# Patient Record
Sex: MALE | Race: WHITE | Employment: FULL TIME | URBAN - METROPOLITAN AREA
[De-identification: names, ages, dates, MRNs, and addresses within clinical notes are randomized per-mention and may not be internally consistent; named-entity substitution may affect disease eponyms.]

---

## 2017-12-19 ENCOUNTER — HOSPITAL ENCOUNTER (INPATIENT)
Facility: HOSPITAL | Age: 57
LOS: 4 days | Discharge: HOME | DRG: 100 | End: 2017-12-23
Attending: EMERGENCY MEDICINE | Admitting: HOSPITALIST
Payer: COMMERCIAL

## 2017-12-19 ENCOUNTER — APPOINTMENT (OUTPATIENT)
Dept: CT IMAGING | Facility: HOSPITAL | Age: 57
DRG: 100 | End: 2017-12-19
Attending: EMERGENCY MEDICINE
Payer: COMMERCIAL

## 2017-12-19 ENCOUNTER — APPOINTMENT (OUTPATIENT)
Dept: GENERAL RADIOLOGY | Facility: HOSPITAL | Age: 57
DRG: 100 | End: 2017-12-19
Attending: HOSPITALIST
Payer: COMMERCIAL

## 2017-12-19 DIAGNOSIS — I62.9 INTRACRANIAL HEMORRHAGE (HCC): ICD-10-CM

## 2017-12-19 DIAGNOSIS — E87.2 METABOLIC ACIDOSIS: ICD-10-CM

## 2017-12-19 DIAGNOSIS — N28.9 RENAL INSUFFICIENCY: ICD-10-CM

## 2017-12-19 DIAGNOSIS — G40.909 SEIZURE DISORDER (HCC): Primary | ICD-10-CM

## 2017-12-19 PROCEDURE — 71010 XR CHEST AP PORTABLE  (CPT=71010): CPT | Performed by: HOSPITALIST

## 2017-12-19 PROCEDURE — 95819 EEG AWAKE AND ASLEEP: CPT | Performed by: OTHER

## 2017-12-19 PROCEDURE — 99221 1ST HOSP IP/OBS SF/LOW 40: CPT | Performed by: NEUROLOGICAL SURGERY

## 2017-12-19 PROCEDURE — 70450 CT HEAD/BRAIN W/O DYE: CPT | Performed by: EMERGENCY MEDICINE

## 2017-12-19 PROCEDURE — 99291 CRITICAL CARE FIRST HOUR: CPT | Performed by: OTHER

## 2017-12-19 PROCEDURE — 72125 CT NECK SPINE W/O DYE: CPT | Performed by: EMERGENCY MEDICINE

## 2017-12-19 RX ORDER — SODIUM PHOSPHATE, DIBASIC AND SODIUM PHOSPHATE, MONOBASIC 7; 19 G/133ML; G/133ML
1 ENEMA RECTAL ONCE AS NEEDED
Status: DISCONTINUED | OUTPATIENT
Start: 2017-12-19 | End: 2017-12-23

## 2017-12-19 RX ORDER — SODIUM CHLORIDE 9 MG/ML
INJECTION, SOLUTION INTRAVENOUS CONTINUOUS
Status: ACTIVE | OUTPATIENT
Start: 2017-12-19 | End: 2017-12-19

## 2017-12-19 RX ORDER — LEVOFLOXACIN 5 MG/ML
750 INJECTION, SOLUTION INTRAVENOUS
Status: DISCONTINUED | OUTPATIENT
Start: 2017-12-19 | End: 2017-12-20

## 2017-12-19 RX ORDER — LABETALOL HYDROCHLORIDE 5 MG/ML
10 INJECTION, SOLUTION INTRAVENOUS EVERY 10 MIN PRN
Status: DISCONTINUED | OUTPATIENT
Start: 2017-12-19 | End: 2017-12-23

## 2017-12-19 RX ORDER — ACETAMINOPHEN 325 MG/1
650 TABLET ORAL EVERY 4 HOURS PRN
Status: DISCONTINUED | OUTPATIENT
Start: 2017-12-19 | End: 2017-12-23

## 2017-12-19 RX ORDER — SENNOSIDES 8.6 MG
17.2 TABLET ORAL NIGHTLY
Status: DISCONTINUED | OUTPATIENT
Start: 2017-12-19 | End: 2017-12-23

## 2017-12-19 RX ORDER — LORAZEPAM 2 MG/ML
1 INJECTION INTRAMUSCULAR ONCE
Status: COMPLETED | OUTPATIENT
Start: 2017-12-19 | End: 2017-12-19

## 2017-12-19 RX ORDER — POLYETHYLENE GLYCOL 3350 17 G/17G
17 POWDER, FOR SOLUTION ORAL DAILY PRN
Status: DISCONTINUED | OUTPATIENT
Start: 2017-12-19 | End: 2017-12-23

## 2017-12-19 RX ORDER — LEVOFLOXACIN 5 MG/ML
500 INJECTION, SOLUTION INTRAVENOUS EVERY 24 HOURS
Status: DISCONTINUED | OUTPATIENT
Start: 2017-12-19 | End: 2017-12-19 | Stop reason: DRUGHIGH

## 2017-12-19 RX ORDER — LORAZEPAM 2 MG/ML
1 INJECTION INTRAMUSCULAR
Status: DISCONTINUED | OUTPATIENT
Start: 2017-12-19 | End: 2017-12-23

## 2017-12-19 RX ORDER — ONDANSETRON 2 MG/ML
4 INJECTION INTRAMUSCULAR; INTRAVENOUS EVERY 6 HOURS PRN
Status: DISCONTINUED | OUTPATIENT
Start: 2017-12-19 | End: 2017-12-19

## 2017-12-19 RX ORDER — METOCLOPRAMIDE HYDROCHLORIDE 5 MG/ML
5 INJECTION INTRAMUSCULAR; INTRAVENOUS EVERY 8 HOURS PRN
Status: DISCONTINUED | OUTPATIENT
Start: 2017-12-19 | End: 2017-12-23

## 2017-12-19 RX ORDER — METOCLOPRAMIDE HYDROCHLORIDE 5 MG/ML
10 INJECTION INTRAMUSCULAR; INTRAVENOUS EVERY 8 HOURS PRN
Status: DISCONTINUED | OUTPATIENT
Start: 2017-12-19 | End: 2017-12-19

## 2017-12-19 RX ORDER — BISACODYL 10 MG
10 SUPPOSITORY, RECTAL RECTAL
Status: DISCONTINUED | OUTPATIENT
Start: 2017-12-19 | End: 2017-12-23

## 2017-12-19 RX ORDER — DIVALPROEX SODIUM 500 MG/1
500 TABLET, DELAYED RELEASE ORAL EVERY 8 HOURS SCHEDULED
Status: DISCONTINUED | OUTPATIENT
Start: 2017-12-19 | End: 2017-12-21

## 2017-12-19 RX ORDER — ONDANSETRON 2 MG/ML
4 INJECTION INTRAMUSCULAR; INTRAVENOUS EVERY 6 HOURS PRN
Status: DISCONTINUED | OUTPATIENT
Start: 2017-12-19 | End: 2017-12-23

## 2017-12-19 RX ORDER — SODIUM CHLORIDE 9 MG/ML
INJECTION, SOLUTION INTRAVENOUS CONTINUOUS
Status: DISCONTINUED | OUTPATIENT
Start: 2017-12-19 | End: 2017-12-22

## 2017-12-19 RX ORDER — DOCUSATE SODIUM 100 MG/1
100 CAPSULE, LIQUID FILLED ORAL 2 TIMES DAILY
Status: DISCONTINUED | OUTPATIENT
Start: 2017-12-19 | End: 2017-12-23

## 2017-12-19 NOTE — CONSULTS
[id number] Liya Basurto Neurosurgery Consult    Blake Ramirez Patient Status:  Emergency    1960 MRN [id number]   Location 656 [facility] Attending Marleny Leos MD   Hosp Day # 0 PCP Oneal Rushing MD     REASON FOR CONSULTATION: 15   SpO2 100%   GENERAL:  Patient is a 61year old male in no acute distress. HEENT:  Normocephalic, nasal trauma with epistaxis  LUNGS: Clear to auscultation bilaterally. HEART:  S1, S2, Regular rate and rhythm.   NEUROLOGICAL:  This patient is alert a follow      REED Zafar  Peer5 [phone]  Pager [phone]  12/19/2017, 1:34 PM     Patient seen and examined with nurse practitioner  Case discussed  62-year-old male status post possible seizure  [facility]

## 2017-12-19 NOTE — CONSULTS
[facility]    Neuro critical care consultation    Saadjenny Dejesus Patient Status:  Inpatient    1960 MRN [id number]   [facility] 6NE-A Attending Man Verdugo,*   Hosp Day # 0 PCP Pradeep Yi MD       Reason for Consu Intravenous, Continuous  •  0.9%  NaCl infusion, , Intravenous, Continuous  •  acetaminophen (TYLENOL) tab 650 mg, 650 mg, Oral, Q4H PRN  •  Labetalol HCl (TRANDATE) injection 10 mg, 10 mg, Intravenous, Q10 Min PRN  •  niCARdipine HCl in NaCl (CARDENE) 20 Gait deferred.     Diagnostic Data:   Lab Results  Component Value Date    12/19/2017   K 4.6 12/19/2017    12/19/2017   CO2 8.0 12/19/2017   BUN 19 12/19/2017   CREATSERUM 2.01 12/19/2017    12/19/2017   CA 9.2 12/19/2017   ALKPHO 83 12/ complex and decision-making and or extensive discussion with the patient/family and clinical status. (Exclusive of billlable procedures)  Thank you for allowing us to participate in your patient's care.       Kodak Pro MD  [facility]

## 2017-12-19 NOTE — ED INITIAL ASSESSMENT (HPI)
EMS called for seizure, post-ictal upon their arrival.  Responding only to pain initially and now alert to verbal.

## 2017-12-19 NOTE — PROGRESS NOTES
12/19/17 1728   Clinical Encounter Type   Visited With Patient and family together   Routine Visit Introduction   Continue Visiting No   Patient Spiritual Encounters   Spiritual Needs Patient and family present spiritual strength   Spiritual Interventio

## 2017-12-19 NOTE — H&P
[facility] Hospitalist History and Physical      CC: AMS, [address]    PCP: Boom Castro MD    History of Present Illness: Patient is a 61year old male with PMH sig for hx of R cavernous angioma with bleed and seizure disorder here with AMS.  Was found down earlier tod distress  Eyes: Pink conjunctivae, No ptosis  Pulm: Lungs clear bilaterally, normal respiratory effort  CV: Heart with regular rate and rhythm, no murmur  GI: Abdomen soft, nontender, nondistended  Ext: No cyanosis, clubbing, or edema  Skin: Skin warm and suspect 2/2 poor PO but unable to obtain good history    Leukocytosis- may be reactive, eval for infectious etiology given AMS  - Check UA/Ucx and CXR  - Check procal  - Afebrile    Hyperglycemia  - No known diagnosis of DM  - HA1C  - SSI for now    FEN:

## 2017-12-19 NOTE — ED PROVIDER NOTES
Patient Seen in: [facility] Emergency Department    History   Patient presents with:  Seizure Disorder (neurologic)    Stated Complaint: seizure, post-ictal    HPI    62-year-old male presents the emergency department for altered mental status.   His HPI.  Constitutional and vital signs reviewed. All other systems reviewed and negative except as noted above.     Physical Exam   ED Triage Vitals  BP: 113/60 [12/19/17 1031]  Pulse: 116 [12/19/17 1031]  Resp: 24 [12/19/17 1031]  Temp: 98.4 °F (36.9 °C Neutrophil Absolute 7.25 (*)     Lymphocyte Absolute 5.28 (*)     Monocyte Absolute 1.16 (*)     Basophil Absolute 0.14 (*)     All other components within normal limits   PROLACTIN - Normal   CBC WITH DIFFERENTIAL WITH PLATELET    Narrative:      The ron degenerative changes.     There is straightening of the normal cervical lordosis with anatomic alignment.  Vertebral body heights are well-maintained.  Moderate degenerative disc space loss and endplate spurring is seen most pronounced at C4-5 thru C6-7.   admitted to the [facility] hospitalist Dr. Rickey Saravia. Neurosurgery consult Dr. Princess Strickland  [facility] neurology Dr. Shantell Kate notified. Agree with initiation of Keppra. Will follow when patient is out of Neuro ICU. Consulted DR. Nathen Mustafa.   Agrees to f

## 2017-12-19 NOTE — PROCEDURES
ELECTROENCEPHALOGRAM REPORT      Patient Name: Kristine Dela Cruz  Chart ID: [id number]  Ordering Physician: Dr Abhi Watters              Date of Test: 12/20/2017  Patient Diagnosis: Generalized seizure    HISTORY    PT IS A 62 YO MALE WHO PRESENTS AFTER A SZ AND A electrographic seizures seen. Clinical correlation is recommended. Thank you for allowing us to participate in your patient's care.       Kathia Rojas MD  [facility]

## 2017-12-19 NOTE — PAYOR COMM NOTE
--------------  ADMISSION REVIEW     Payor: [facility]  Subscriber #:  [id number]  Authorization Number: [id number]/A    Admit date: 12/19/17  Admit time: 1341       Admitting Physician: Len Estrada MD  Attending Physician:  Len Estrada Review of patient's history is that he had a prior history of acute intracranial hemorrhage suspected to be secondary to cavernous angioma. He stabilized without any neurosurgical intervention.   He initially was treated with Dilantin and was seen by Joie Abdomen: Soft, nontender, nondistended. There is bowel sounds throughout 4 quadrants. There is no guarding or rebound tenderness. Extremities: There is no clubbing, cyanosis, edema. Neurologic exam: Cranial nerves 2-12 are intact.   There is no focal mo Comment: FINDINGS:    No acute fracture or subluxation in the cervical spine.  C1-2 articulation intact with mild degenerative changes.   There is straightening of the normal cervical lordosis with anatomic alignment.  Vertebral body heights are well-maint This is a 68-year-old male with a previous history of a cavernous angioma and intracranial hemorrhage who presents for an acute generalized seizure. CT scan of the brain shows questionable hemorrhage to the right lateral frontal lobe.   Patient's neurologi From NS standpoint, do not need to repeat CT brain unless he has neuro changes. Recommend holding off on MRI for 2 weeks to give the blood time to reabsorb to get a better look at the CM.     Dr. Bula Gottron to [address]  [address]

## 2017-12-20 ENCOUNTER — APPOINTMENT (OUTPATIENT)
Dept: CV DIAGNOSTICS | Facility: HOSPITAL | Age: 57
DRG: 100 | End: 2017-12-20
Attending: HOSPITALIST
Payer: COMMERCIAL

## 2017-12-20 ENCOUNTER — APPOINTMENT (OUTPATIENT)
Dept: ULTRASOUND IMAGING | Facility: HOSPITAL | Age: 57
DRG: 100 | End: 2017-12-20
Attending: INTERNAL MEDICINE
Payer: COMMERCIAL

## 2017-12-20 PROCEDURE — 93306 TTE W/DOPPLER COMPLETE: CPT | Performed by: HOSPITALIST

## 2017-12-20 PROCEDURE — 99291 CRITICAL CARE FIRST HOUR: CPT | Performed by: OTHER

## 2017-12-20 PROCEDURE — 99231 SBSQ HOSP IP/OBS SF/LOW 25: CPT | Performed by: NEUROLOGICAL SURGERY

## 2017-12-20 PROCEDURE — 99255 IP/OBS CONSLTJ NEW/EST HI 80: CPT | Performed by: INTERNAL MEDICINE

## 2017-12-20 PROCEDURE — 76775 US EXAM ABDO BACK WALL LIM: CPT | Performed by: INTERNAL MEDICINE

## 2017-12-20 NOTE — OCCUPATIONAL THERAPY NOTE
OCCUPATIONAL THERAPY QUICK EVALUATION - INPATIENT    Room Number: 9884/8413-R  Evaluation Date: 12/20/2017     Type of Evaluation: Quick Eval  Presenting Problem: seizure, R frontal intracranial hemorrhage    Physician Order: IP Consult to Occupational The independent with ADL, IADL. Lives with wife.      OBJECTIVE  Precautions: Seizure  Fall Risk: Standard fall risk    WEIGHT BEARING RESTRICTION  Weight Bearing Restriction: None                PAIN ASSESSMENT  Ratin          COGNITION  intact    RANGE OF cavernous malformation. CT cervical spine negative. Complete medical history and occupational profile noted above. Functional outcome measures completed include AM-PAC, ROM, and PHQ 9. Pt is performing ADL safely and independently.         Therapist led p

## 2017-12-20 NOTE — CM/SW NOTE
12/20/17 1100   CM/SW Screening   Referral [facility] staff; Chart review     Pt discussed with therapy and nursing, pt has no dc needs.   Lani Harris, 12/20/17, 11:47 AM

## 2017-12-20 NOTE — PROGRESS NOTES
Assumed care for patient at 0730. Patient alert and oriented x4. No complaints of pain. Neuros intact. No seizures noted. VSS. Patient ambulating in room but refusing to get to the chair because he was tired. Plan of care discussed with patient and wife.  A

## 2017-12-20 NOTE — PROGRESS NOTES
[facility] Hospitalist Progress Note                                                                   [address] [facility]  12/28/1960    CC: FU fall     Interval History:  - Feels better today though r above.    Assessment/Plan:  AMS/Fall  - Suspect 2/2 seizure though also eval for infection, cardiac etiology  - With small ICH  - Infectious workup as below so far unrevealing  - EKG sinus tach, no acute ischemic change, trop negative  - Monitor on tele- n

## 2017-12-20 NOTE — PROGRESS NOTES
Patient transferred from [address] [place] [address].. Patient alert and oriented x4. No complaints of pain. Wife at bedside. Neuro checks q4 hours, stable. VS stable. Will continue to monitor patient.

## 2017-12-20 NOTE — CONSULTS
[facility]  Report of Consultation    Екатерина Stoddard Patient Status:  Inpatient    1960 MRN [id number]   [place] [facility] 6NE-A Attending Damian Florez,*   Hosp Day # 1 PCP Gwyn Keating MD     Reason for Consultation:  RUBEN rectal suppository 10 mg, 10 mg, Rectal, Daily PRN  •  FLEET ENEMA (FLEET) 7-19 GM/118ML enema 133 mL, 1 enema, Rectal, Once PRN  •  LORazepam (ATIVAN) injection 1 mg, 1 mg, Intravenous, Q15 Min PRN  •  divalproex Sodium (DEPAKOTE) DR tab 500 mg, 500 mg, O 12/20/2017   HCT 38.0 12/20/2017   .0 12/20/2017   CREATSERUM 3.72 12/20/2017   BUN 29 12/20/2017    12/20/2017   K 4.7 12/20/2017    12/20/2017   CO2 21.0 12/20/2017    12/20/2017   CA 8.0 12/20/2017   ALB 4.0 12/19/2017   ALKPHO Bladder not full but will check formal renal u/s. U/A otherwise not suggestive of GN. Will check urine lytes and pro/creat. incr IVF and will follow. Rate in rise in creat certainly concerning and will follow closely for need for HD.       #2.  AMS/fall

## 2017-12-20 NOTE — PHYSICAL THERAPY NOTE
PHYSICAL THERAPY QUICK EVALUATION - INPATIENT    Room Number: 7887/5687-M  Evaluation Date: 12/20/2017  Presenting Problem: R frontal SAH  Physician Order: PT Eval and Treat     Pt is 61year old male admitted on 12/19/2017 from home,he ws found by his s wheelchair, bedside commode, etc.): None   -   Moving from lying on back to sitting on the side of the bed?: None   How much help from another person does the patient currently need. ..   -   Moving to and from a bed to a chair (including a wheelchair)?: No

## 2017-12-20 NOTE — PROGRESS NOTES
[id number] Liya Basurto Neurosurgery Progress Note    Alok Tellez Patient Status:  Inpatient    1960 MRN [id number]   [facility] 6NE-A Attending Marjan Romero Eduardo Day # 1 PCP Zeke Andrews MD     Subjective:  Will  consulted    Dr. Lyla Dasilva to follow      REED Franklin  [address]  Pager [phone]  12/20/2017, 9:22 AM      Pt seen and examined with np  case discussed  Doing well today  Medical issues paramount  Ok to d/c from [facility]

## 2017-12-20 NOTE — CONSULTS
[facility] Pharmacy Note:  Renal Dose Adjustment for Metoclopramide (REGLAN)    Екатерина Stoddard has been prescribed Metoclopramide (REGLAN) 10 mg every 8 hours as needed for N/V. Estimated Creatinine Clearance: 37.9 mL/min (based on SCr of 2.01 mg/dL (H)).     H

## 2017-12-20 NOTE — PLAN OF CARE
Impaired Activities of Daily Living    • Achieve highest/safest level of independence in self care Progressing        NEUROLOGICAL - ADULT    • Absence of seizures Progressing    • Achieves maximal functionality and self care Progressing        Pt remains

## 2017-12-20 NOTE — PROGRESS NOTES
[facility]    Progress Note    Milagro Carlinandie Patient Status:  Inpatient    1960 MRN [id number]   [facility] 6NE-A Attending Alessandra Romero Day # 1 PCP Francesca Fields MD     Subjective:  Milagroawa Askew is a(n) 5 negative. Repeat CT head with any change in his status otherwise Neurosurgery does not want any repeat CT scan.  MRI brain with contrast in 2 weeks as an outpatient        Cardiac:  Monitor vitals and goal SBP < 160        -Pulmonary:  - saturating well o

## 2017-12-20 NOTE — CONSULTS
[facility] Pharmacy Note:  Renal Adjustment for Levaquin (levofloxacin)    Nani Gomez is a 61year old male who has been prescribed Levaquin (levofloxacin) 500 mg every 24 hrs.   CrCl is estimated creatinine clearance is 37.9 mL/min (based on SCr of 2.01 mg/dL

## 2017-12-21 PROCEDURE — 99233 SBSQ HOSP IP/OBS HIGH 50: CPT | Performed by: INTERNAL MEDICINE

## 2017-12-21 PROCEDURE — 99233 SBSQ HOSP IP/OBS HIGH 50: CPT | Performed by: OTHER

## 2017-12-21 RX ORDER — MAGNESIUM CARB/ALUMINUM HYDROX 105-160MG
148 TABLET,CHEWABLE ORAL ONCE AS NEEDED
Status: ACTIVE | OUTPATIENT
Start: 2017-12-21 | End: 2017-12-21

## 2017-12-21 RX ORDER — DIVALPROEX SODIUM 500 MG/1
500 TABLET, DELAYED RELEASE ORAL EVERY 12 HOURS SCHEDULED
Status: DISCONTINUED | OUTPATIENT
Start: 2017-12-21 | End: 2017-12-22

## 2017-12-21 NOTE — PROGRESS NOTES
[id number] Liya Basurto Neurology Progress Note    Christal Jane Patient Status:  Inpatient    1960 MRN [id number]   [facility] 7NE-A Attending Heather, [employer] Day # 2 PCP Norvel Lefort, MD         Subjective:  Rosa De (Neutrophils 10)  CMP with renal dysfunction  Depakote level pending      Assessment:   Breakthrough Seizure, EEG as above, mildly abnl, slowed  Hx Seizures since 2011, not on AEDs at home, intolerance to Keppra (mood/anger)  R frontal cavernous angioma, k

## 2017-12-21 NOTE — PROGRESS NOTES
[facility] Hospitalist Progress Note                                                                   [address] [facility]  12/28/1960    CC: FU fall     Interval History:  - Sleeping    Current Meds: management per neurology     Seizure disorder   - AED per neuro, dose decreased today due to [facility]  - Per NS, no plan for operative intervention  - Not on any AC  - BP OK     RUBEN  - Felt to be 2/2 ATN per renal  - Worse today  - IVF per renal  - Cont

## 2017-12-21 NOTE — PLAN OF CARE
Assumed care at [address]. Alert and oriented x4. Telemetry monitor reading SR. RA. Rt AC with IVF infusing assessed and maintained. Assessment unchanged from the beginning of shift. Call light in reach at the bedside. Will continue to monitor.      Impaired Act

## 2017-12-21 NOTE — PROGRESS NOTES
[facility]  Nephrology Progress Note    Dicky Knee Patient Status:  Inpatient    1960 MRN [id number]   [facility] 7NE-A Attending Raimundo Stringer,*   Hosp Day # 2 PCP Darcie Thomas MD       SUBJECTIVE:  Appetite poor, 4.0  2.9*       Recent Labs   Lab  12/19/17   1702  12/19/17   2150  12/20/17   0756  12/20/17   1145  12/20/17   2037   PGLU  87  98  114*  93  92     Urine creat 36.5  Urine na 37    Renal u/s no hydro    Meds:     Current Facility-Administered Medicatio

## 2017-12-21 NOTE — PLAN OF CARE
Assumed care @ 0700  Drowsy, Ox4, VSS on RA, NSR on tele  No neuro deficits noted  Pt states he feels \"woozy\" and \"out of it\" which he is attributing to depakote. Geo MASTERS updated. depakote decreased to bid  Pt with poor appetite and nausea.  Ernie Canchola

## 2017-12-21 NOTE — PAYOR COMM NOTE
--------------  CONTINUED STAY REVIEW    Payor: [employer]  Subscriber #:  [id number]  Authorization Number: [id number]    Admit date: 12/19/17  Admit time: 56    Admitting Physician: David Mathew MD  Attending Physician:  Dread Thomason back off IVF but cont at slower rate given ongoing poor intake. Will follow creat closely but no indication as yet for HD. Hopefully creat will plateau and improve next few days.   #2. Seizure- remains stable.   Per neuro  Questions/concerns were discuss

## 2017-12-22 PROCEDURE — 99233 SBSQ HOSP IP/OBS HIGH 50: CPT | Performed by: INTERNAL MEDICINE

## 2017-12-22 PROCEDURE — 99233 SBSQ HOSP IP/OBS HIGH 50: CPT | Performed by: OTHER

## 2017-12-22 NOTE — PROGRESS NOTES
[facility]  Nephrology Progress Note    Екатерина Stoddard Patient Status:  Inpatient    1960 MRN [id number]   [facility] 7NE-A Attending Daja Romero Day # 3 PCP Gwyn Keating MD       SUBJECTIVE:  Appetite improv 12/19/17   2150  12/20/17   0756  12/20/17   1145  12/20/17 2037   PGLU  87  98  114*  93  92     Urine creat 36.5  Urine na 37    Renal u/s no hydro    Meds:     Current Facility-Administered Medications:  Valproate Sodium (DEPACON) 500 mg in sodium chl

## 2017-12-22 NOTE — PAYOR COMM NOTE
--------------  CONTINUED STAY REVIEW    Payor: [facility]  Subscriber #:  [id number]  Authorization Number: [id number]    Admit date: 12/19/17  Admit time: 1341    Admitting Physician: Leia Cohen MD  Attending Physician:  Domingo Boyce MD  P Valproate Sodium (DEPACON) 500 mg in sodium chloride 0.9 % 100 mL IVPB     Date Action Dose Route User    12/22/2017 1049 New Bag 500 mg Intravenous Christiano Perales RN          PER NEPHROLOGY 12-21:      #1.  RUBEN- clinical picture most c/w ATN as no evidence

## 2017-12-22 NOTE — PROGRESS NOTES
[id number] Liya Basurto Neurology Progress Note    Ashli Ask Patient Status:  Inpatient    1960 MRN [id number]   [facility] 7NE-A Attending Stephanie Romero Day # 3 PCP Sammi Hodgkins, MD         Subjective:  Afsaneh Proper level 90 on 12/21, will re-check tomorrow 12/23       Assessment:   Breakthrough Seizure, EEG as above, mildly abnl, slowed  Hx Seizures since 2011, not on AEDs at home, intolerance to Keppra (mood/anger)  R frontal cavernous angioma, known since 2011  RUBEN touch throughout   Coord: FNF intact with no tremor or dysmetria  Romberg: deferred  Gait: deferred    Imaging: no new imaging; prior imaging as noted above       Assessment/Plan:   Breakthrough Seizure: history of seizures 2011, not on AED medications at

## 2017-12-22 NOTE — PLAN OF CARE
Assumed care at [address]. Alert and oriented x4. Telemetry monitor reading SR. Rt AC with IVF infusing assessed and maintained. Suppository given before change of shift and patient stated he had multiple stools throughout night.  Nausea improved, but not comple

## 2017-12-23 ENCOUNTER — TELEPHONE (OUTPATIENT)
Dept: NEPHROLOGY | Facility: CLINIC | Age: 57
End: 2017-12-23

## 2017-12-23 VITALS
WEIGHT: 208.56 LBS | TEMPERATURE: 98 F | SYSTOLIC BLOOD PRESSURE: 135 MMHG | HEIGHT: 67 IN | BODY MASS INDEX: 32.73 KG/M2 | DIASTOLIC BLOOD PRESSURE: 72 MMHG | HEART RATE: 58 BPM | OXYGEN SATURATION: 96 % | RESPIRATION RATE: 18 BRPM

## 2017-12-23 DIAGNOSIS — N17.9 AKI (ACUTE KIDNEY INJURY) (HCC): Primary | ICD-10-CM

## 2017-12-23 PROCEDURE — 99233 SBSQ HOSP IP/OBS HIGH 50: CPT | Performed by: OTHER

## 2017-12-23 PROCEDURE — 99232 SBSQ HOSP IP/OBS MODERATE 35: CPT | Performed by: INTERNAL MEDICINE

## 2017-12-23 RX ORDER — DIVALPROEX SODIUM 250 MG/1
250 TABLET, DELAYED RELEASE ORAL EVERY 12 HOURS SCHEDULED
Status: DISCONTINUED | OUTPATIENT
Start: 2017-12-23 | End: 2017-12-23

## 2017-12-23 RX ORDER — DIVALPROEX SODIUM 500 MG/1
500 TABLET, DELAYED RELEASE ORAL EVERY 12 HOURS SCHEDULED
Status: DISCONTINUED | OUTPATIENT
Start: 2017-12-23 | End: 2017-12-23

## 2017-12-23 RX ORDER — DIVALPROEX SODIUM 500 MG/1
500 TABLET, DELAYED RELEASE ORAL EVERY 12 HOURS SCHEDULED
Qty: 60 TABLET | Refills: 1 | Status: SHIPPED | OUTPATIENT
Start: 2017-12-23 | End: 2017-12-27

## 2017-12-23 RX ORDER — ONDANSETRON 4 MG/1
4 TABLET, FILM COATED ORAL EVERY 8 HOURS PRN
Qty: 10 TABLET | Refills: 0 | Status: SHIPPED | OUTPATIENT
Start: 2017-12-23 | End: 2017-12-27 | Stop reason: ALTCHOICE

## 2017-12-23 NOTE — PROGRESS NOTES
[id number] Liya Basurto Neurology Progress Note    Dread Anaya Patient Status:  Inpatient    1960 MRN [id number]   [facility] 7NE-A Attending Deangelo Marcelo MD   [facility] Day # 4 PCP Shekhar Borges MD     Subjective:  Dread Anaya is a(n) Units 12/22/2017   AMMONIA      11 - 32 umol/L 46 (H)     Imaging:  No new imaging     Impression:  Breakthrough seizures  Hx seizures since 2011, intolerant of Keppra  R frontal cavernous angioma since 2011  RUBEN  Mild leukocytosis, likely reactive    Plan at home -  intolerance to Keppra (mood/anger)  R frontal cavernous angioma, known since 2011  RUBEN, Cr increasing, likely ATN per Nephrology     Plan:  Depakote 500mg PO BID (decreased from TID due to lethargy). Depakote level 90 on 12/21.   Patient with N/

## 2017-12-23 NOTE — PLAN OF CARE
Pt stable overnight. Neuro assessment unremarkable. Denies any ha,blurred or double vision.  VSS. NSR on tele. Remains on RA w/O2 sats > 92% on RA. in place,however pt removes at times. Seizure precautions maintained. Depacon IV cont as ordered.   Up ad kg

## 2017-12-23 NOTE — PROGRESS NOTES
[facility] Hospitalist Progress Note                                                                   [address] [facility]  12/28/1960    CC: FU fall     Interval History:  Feels well. Less nauseated.  H management per neurology-- on depakote     Seizure disorder   - AED per neuro, dose decreased today due to [facility]  - Per NS, no plan for operative intervention  - Not on any AC  - BP OK     RUBEN  - Felt to be 2/2 ATN per renal  - having good UOP, discu

## 2017-12-23 NOTE — PROGRESS NOTES
[facility]  Nephrology Progress Note    Christal Jane Attending:  Marlon Cruz MD       Assessment and Plan:    1) RUBEN- course c/w multifactorial ATN now improving with good UO; lytes / volume OK.  Expect ongoing improvement over the next week to bas Imaging: All imaging studies reviewed.     Meds:     Current Facility-Administered Medications:  Valproate Sodium (DEPACON) 500 mg in sodium chloride 0.9 % 100 mL IVPB 500 mg Intravenous Q12H   acetaminophen (TYLENOL) tab 650 mg 650 mg Oral Q4H PRN

## 2017-12-26 ENCOUNTER — TELEPHONE (OUTPATIENT)
Dept: NEUROLOGY | Facility: CLINIC | Age: 57
End: 2017-12-26

## 2017-12-26 ENCOUNTER — TELEPHONE (OUTPATIENT)
Dept: NEPHROLOGY | Facility: CLINIC | Age: 57
End: 2017-12-26

## 2017-12-26 DIAGNOSIS — N17.9 AKI (ACUTE KIDNEY INJURY) (HCC): Primary | ICD-10-CM

## 2017-12-26 NOTE — TELEPHONE ENCOUNTER
Pt is getting labs drawn 12-27-17 & next week. Wife thought you wanted to see him this week?   When should he come in?  [phone]

## 2017-12-26 NOTE — TELEPHONE ENCOUNTER
Patient states he is only getting 45 minutes sleep at a time and wanting to know what his options are because he is very sensitive to medication.  Informed patient he needs to discuss this in detail with Dr Cliff Cordero and patient given 3:40 pm appointment 12/17/

## 2017-12-27 ENCOUNTER — OFFICE VISIT (OUTPATIENT)
Dept: NEUROLOGY | Facility: CLINIC | Age: 57
End: 2017-12-27

## 2017-12-27 VITALS
BODY MASS INDEX: 34 KG/M2 | SYSTOLIC BLOOD PRESSURE: 134 MMHG | RESPIRATION RATE: 16 BRPM | WEIGHT: 215 LBS | HEART RATE: 60 BPM | DIASTOLIC BLOOD PRESSURE: 78 MMHG

## 2017-12-27 DIAGNOSIS — I62.9 INTRACRANIAL HEMORRHAGE (HCC): ICD-10-CM

## 2017-12-27 DIAGNOSIS — G40.909 SEIZURE DISORDER (HCC): Primary | ICD-10-CM

## 2017-12-27 PROCEDURE — 99215 OFFICE O/P EST HI 40 MIN: CPT | Performed by: OTHER

## 2017-12-27 RX ORDER — CARBAMAZEPINE 200 MG/1
200 TABLET, EXTENDED RELEASE ORAL 2 TIMES DAILY
Qty: 60 TABLET | Refills: 1 | Status: SHIPPED | OUTPATIENT
Start: 2017-12-27 | End: 2018-01-02

## 2017-12-27 RX ORDER — CARBAMAZEPINE 200 MG/1
200 TABLET, EXTENDED RELEASE ORAL 2 TIMES DAILY
Qty: 60 TABLET | Refills: 1 | Status: SHIPPED | OUTPATIENT
Start: 2017-12-27 | End: 2017-12-27

## 2017-12-27 NOTE — PROGRESS NOTES
[facility] Neurology outpatient progress note  Date of service: 12/27/2017    Patient here for a follow-up visit for seizure disorder, pt has known cavernous malformation in right frontal lobe with recent small SAH/intracranial hemorrhage.  Since discharge, no sei ptosis  V face sensation normal  VII face symmetry  VIII hearing normal  IX, X, XI palate elevates symmetric   XII tongue midline, normal motility, no atrophy  Motor strength: 5/5 all extremities  Tone: normal  DTRs: 2+ symmetric  Plantar response: bilater

## 2017-12-27 NOTE — PROGRESS NOTES
At checkout, pt requested a printed script and change of pharmacies for Carbamazepine. Rx re-ordered as written order. Printed script given to patient.

## 2017-12-27 NOTE — PATIENT INSTRUCTIONS
Refill policies:    • Allow 2-3 business days for refills; controlled substances may take longer.   • Contact your pharmacy at least 5 days prior to running out of medication and have them send an electronic request or submit request through the [facility] have a procedure or additional testing performed. [facility]) will contact your insurance carrier to obtain pre-certification or prior authorization.     Unfortunately, DEL has seen an increase in denial of payment even though the p

## 2017-12-30 NOTE — TELEPHONE ENCOUNTER
D/W wife- renal function improving Cr 4.8 -> 2.8; will recheck next week, expect return to baseline in 1-2 weeks after recent hospitalization / JOSEPH- beatriz pinedo

## 2018-01-02 ENCOUNTER — TELEPHONE (OUTPATIENT)
Dept: NEUROLOGY | Facility: CLINIC | Age: 58
End: 2018-01-02

## 2018-01-02 DIAGNOSIS — G40.909 SEIZURE DISORDER (HCC): Primary | ICD-10-CM

## 2018-01-02 RX ORDER — PHENYTOIN SODIUM 100 MG/1
100 CAPSULE, EXTENDED RELEASE ORAL 3 TIMES DAILY
Qty: 90 CAPSULE | Refills: 0 | Status: SHIPPED | OUTPATIENT
Start: 2018-01-02 | End: 2018-01-02

## 2018-01-02 RX ORDER — TOPIRAMATE 50 MG/1
50 TABLET, FILM COATED ORAL 2 TIMES DAILY
Qty: 60 TABLET | Refills: 0 | Status: SHIPPED | OUTPATIENT
Start: 2018-01-02 | End: 2018-01-25 | Stop reason: ALTCHOICE

## 2018-01-02 NOTE — TELEPHONE ENCOUNTER
Left detailed message on personalized voicemail (acceptable per HIPPA) relaying doctors recommendations. Informed patient prescription for Topamax 50 mg BID was sent to preferred pharmacy on file.      Provided patient with office number and informed with c

## 2018-01-02 NOTE — TELEPHONE ENCOUNTER
Dc tegretol. For new sx or seizure, pt is advised to go ER for evaluation.   Pt reported side effect from dilantin, keppra, depakote, now tegretol, unfortunately pt continues at risk for seizure, next medication I recommend is topamax 50 mg bid, please be a

## 2018-01-02 NOTE — TELEPHONE ENCOUNTER
Patient started medication- Carbamazepine last week   Patient noticed a rash on the upper right abdomen after second or third dose , patient stated it tingles and itches more after taking medication.      Also reports a vascular throbbing in neck,  left tonya

## 2018-01-03 ENCOUNTER — TELEPHONE (OUTPATIENT)
Dept: NEUROLOGY | Facility: CLINIC | Age: 58
End: 2018-01-03

## 2018-01-03 NOTE — TELEPHONE ENCOUNTER
Informed wife, Nikole Ceron  (ok with HIPPA consent), that Topamax that was sent to pharmacy is for 50 mg BID which is the correct dose, not 500 or 200 mg. Verbalizes understanding.

## 2018-01-05 LAB
BUN/CREATININE RATIO: 14 (CALC) (ref 6–22)
BUN: 26 MG/DL (ref 7–25)
CALCIUM: 9.4 MG/DL (ref 8.6–10.3)
CARBON DIOXIDE: 27 MMOL/L (ref 20–31)
CHLORIDE: 100 MMOL/L (ref 98–110)
CREATININE: 1.91 MG/DL (ref 0.7–1.33)
EGFR IF AFRICN AM: 44 ML/MIN/1.73M2
EGFR IF NONAFRICN AM: 38 ML/MIN/1.73M2
GLUCOSE: 110 MG/DL (ref 65–99)
POTASSIUM: 4.3 MMOL/L (ref 3.5–5.3)
SODIUM: 135 MMOL/L (ref 135–146)

## 2018-01-09 ENCOUNTER — TELEPHONE (OUTPATIENT)
Dept: NEPHROLOGY | Facility: CLINIC | Age: 58
End: 2018-01-09

## 2018-01-09 DIAGNOSIS — N17.9 AKI (ACUTE KIDNEY INJURY) (HCC): Primary | ICD-10-CM

## 2018-01-09 NOTE — TELEPHONE ENCOUNTER
Pt's wife wants to know what level creatinine needs to be in order for pt to have MRI with contrast.  [phone]

## 2018-01-11 ENCOUNTER — TELEPHONE (OUTPATIENT)
Dept: NEUROLOGY | Facility: CLINIC | Age: 58
End: 2018-01-11

## 2018-01-11 DIAGNOSIS — G40.909 SEIZURE DISORDER (HCC): Primary | ICD-10-CM

## 2018-01-11 NOTE — TELEPHONE ENCOUNTER
Patient calling to report a reaction to Topamax. He states took two doses of Topamax and had a severe headache  Confusion, frustration, he stated he couldn't type emails, and couldn't function at work at all.      Patient states he started taking Depakote b

## 2018-01-11 NOTE — TELEPHONE ENCOUNTER
Unfortunately I do not have other medications to offer at this time, I recommend pt to seek seizure expert/epileptologist's opinion in [facility] or other [place]. I have put in referral, please be advised to go ER for seizure activity.

## 2018-01-12 ENCOUNTER — TELEPHONE (OUTPATIENT)
Dept: SURGERY | Facility: CLINIC | Age: 58
End: 2018-01-12

## 2018-01-12 NOTE — TELEPHONE ENCOUNTER
Patient's wife calling stating MRI order was needed for follow up appointment.       Per  on 12/21/17 in ER:   \"Plan:  Seizure management per Neuro CC  No further imaging unless he has neurological changes  Will see him in 2 weeks in the clinic

## 2018-01-12 NOTE — TELEPHONE ENCOUNTER
Patient informed of below. Patient states he took 1/4 of 250 mg tablet Depakote BID yesterday and is not having the severe side effects. Asking if this can be therapeutic for seizure. Advised probably not.  States he is willing to take 125 mg in am and the

## 2018-01-15 NOTE — TELEPHONE ENCOUNTER
Spoke with patient and relayed message from Dr. William Yuen that pts current self-dosing of Depakote is not therapeutic. [facility]     Pt was thankful for the information, but expressed that he will be following up with neuro

## 2018-01-15 NOTE — TELEPHONE ENCOUNTER
Thanks for the update, I do not think this dosage of depokote would prevent seizure, please see my previous note for recommendation. I do not know much about medical marijuana for seizure. Either way pt should seek high level seizure management.

## 2018-01-25 ENCOUNTER — OFFICE VISIT (OUTPATIENT)
Dept: SURGERY | Facility: CLINIC | Age: 58
End: 2018-01-25

## 2018-01-25 VITALS
HEIGHT: 67 IN | BODY MASS INDEX: 30.29 KG/M2 | SYSTOLIC BLOOD PRESSURE: 122 MMHG | WEIGHT: 193 LBS | DIASTOLIC BLOOD PRESSURE: 68 MMHG | HEART RATE: 76 BPM

## 2018-01-25 DIAGNOSIS — Q28.3 CAVERNOUS MALFORMATION: Primary | ICD-10-CM

## 2018-01-25 PROCEDURE — 99213 OFFICE O/P EST LOW 20 MIN: CPT | Performed by: NEUROLOGICAL SURGERY

## 2018-01-25 RX ORDER — MULTIVIT-MIN/IRON FUM/FOLIC AC 7.5 MG-4
1 TABLET ORAL DAILY
COMMUNITY

## 2018-01-25 NOTE — PROGRESS NOTES
Hospital follow up, post brain bleed. Patient almost back to normal. Concentration not as sharp. No headaches, balance issues while on Topamax but has improved since weaning off. Having STM issues per his wife.

## 2018-01-25 NOTE — PATIENT INSTRUCTIONS
Refill policies:    • Allow 2-3 business days for refills; controlled substances may take longer.   • Contact your pharmacy at least 5 days prior to running out of medication and have them send an electronic request or submit request through the [facility] recommended that you have a procedure or additional testing performed. [facility]) will contact your insurance carrier to obtain pre-certification or prior authorization.     Unfortunately, [facility] has seen an increase in denial of paym
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
## 2018-04-05 DIAGNOSIS — N17.9 ACUTE RENAL FAILURE, UNSPECIFIED ACUTE RENAL FAILURE TYPE (HCC): Primary | ICD-10-CM

## 2018-04-21 ENCOUNTER — TELEPHONE (OUTPATIENT)
Dept: NEPHROLOGY | Facility: CLINIC | Age: 58
End: 2018-04-21